# Patient Record
Sex: FEMALE | Race: WHITE | NOT HISPANIC OR LATINO | Employment: FULL TIME | URBAN - METROPOLITAN AREA
[De-identification: names, ages, dates, MRNs, and addresses within clinical notes are randomized per-mention and may not be internally consistent; named-entity substitution may affect disease eponyms.]

---

## 2024-09-09 ENCOUNTER — TELEPHONE (OUTPATIENT)
Dept: GYNECOLOGIC ONCOLOGY | Facility: CLINIC | Age: 39
End: 2024-09-09
Payer: COMMERCIAL

## 2024-09-09 NOTE — TELEPHONE ENCOUNTER
Caller: Chyna Driver    Relationship to patient: Self    Best call back number:   Telephone Information:   Mobile [phone]     Chief complaint: STARTING A NEW JOB    Type of visit: NEW PATIENT    Requested date: CALL TO R/S      If rescheduling, when is the original appointment: 9/11/2024

## 2024-10-04 ENCOUNTER — PREP FOR SURGERY (OUTPATIENT)
Dept: OTHER | Facility: HOSPITAL | Age: 39
End: 2024-10-04
Payer: COMMERCIAL

## 2024-10-04 ENCOUNTER — OFFICE VISIT (OUTPATIENT)
Dept: GYNECOLOGIC ONCOLOGY | Facility: CLINIC | Age: 39
End: 2024-10-04
Payer: COMMERCIAL

## 2024-10-04 ENCOUNTER — PATIENT ROUNDING (BHMG ONLY) (OUTPATIENT)
Dept: GYNECOLOGIC ONCOLOGY | Facility: CLINIC | Age: 39
End: 2024-10-04
Payer: COMMERCIAL

## 2024-10-04 VITALS
BODY MASS INDEX: 42.02 KG/M2 | WEIGHT: 246.1 LBS | TEMPERATURE: 97.1 F | SYSTOLIC BLOOD PRESSURE: 105 MMHG | DIASTOLIC BLOOD PRESSURE: 74 MMHG | RESPIRATION RATE: 17 BRPM | HEART RATE: 96 BPM | OXYGEN SATURATION: 99 % | HEIGHT: 64 IN

## 2024-10-04 DIAGNOSIS — D07.1 VIN III (VULVAR INTRAEPITHELIAL NEOPLASIA III): Primary | ICD-10-CM

## 2024-10-04 DIAGNOSIS — Z72.0 TOBACCO ABUSE: ICD-10-CM

## 2024-10-04 DIAGNOSIS — N87.1 CIN II (CERVICAL INTRAEPITHELIAL NEOPLASIA II): Primary | ICD-10-CM

## 2024-10-04 DIAGNOSIS — D07.1 VIN III (VULVAR INTRAEPITHELIAL NEOPLASIA III): ICD-10-CM

## 2024-10-04 DIAGNOSIS — N87.1 CIN II (CERVICAL INTRAEPITHELIAL NEOPLASIA II): ICD-10-CM

## 2024-10-04 PROCEDURE — 1160F RVW MEDS BY RX/DR IN RCRD: CPT | Performed by: OBSTETRICS & GYNECOLOGY

## 2024-10-04 PROCEDURE — 1159F MED LIST DOCD IN RCRD: CPT | Performed by: OBSTETRICS & GYNECOLOGY

## 2024-10-04 PROCEDURE — 99204 OFFICE O/P NEW MOD 45 MIN: CPT | Performed by: OBSTETRICS & GYNECOLOGY

## 2024-10-04 PROCEDURE — 88305 TISSUE EXAM BY PATHOLOGIST: CPT | Performed by: OBSTETRICS & GYNECOLOGY

## 2024-10-04 PROCEDURE — 1126F AMNT PAIN NOTED NONE PRSNT: CPT | Performed by: OBSTETRICS & GYNECOLOGY

## 2024-10-04 RX ORDER — LANOLIN ALCOHOL/MO/W.PET/CERES
CREAM (GRAM) TOPICAL
COMMUNITY
Start: 2024-09-23

## 2024-10-04 RX ORDER — FAMOTIDINE 20 MG/1
1 TABLET, FILM COATED ORAL EVERY 12 HOURS SCHEDULED
COMMUNITY
Start: 2024-09-06

## 2024-10-04 RX ORDER — CITALOPRAM HYDROBROMIDE 10 MG/1
1 TABLET ORAL DAILY
COMMUNITY
Start: 2024-09-13

## 2024-10-04 NOTE — PATIENT INSTRUCTIONS
Outpatient Patient Education  [facility]       Chyna Driver  [id number]  1985    SURGEON: Catalina Crum MD        Surgery Coordinator: Debbi DAVIS    Gynecological Oncology  [address]   [place], [address]  Phone: [phone]                   Fax: [phone]        Surgery Appointment      Your surgery at [facility] ([facility]) has been scheduled on 11/11/2024.  [facility] arrive at 12:15 am is located at [address], [address] [place], [address].    Nothing by mouth after midnight on 11/10/2024.    If you are feeling sick, have a fever or cough and have seen your PCP let our office know 48 hours prior to surgery. It may be subject to rescheduling.       The Day of Surgery:    Do not chew gum or tobacco, smoke, or eat mints or hard candy. Shower and wash your hair. You may brush your teeth but do not swallow water. Use any wipes that Pre-admission testing has given you.     Please arrive for surgery as instructed by the pre-op nurse, often one to two hours before your surgery.  Once you are called to go to your pre-op room, no one will be allowed in the pre op room.   Please note no one under age 12 is permitted to stay in the waiting area without supervision.  Remove all jewelry, including rings and piercings. Do not bring valuables to the hospital.  Wear loose-fitting clothing.  Avoid wearing eye makeup or contact lenses  We make every effort to begin surgery at your scheduled start time but delays do occur. We will keep you and your family updated about any delays  Please note: you MUST have a  over the age of 18 to drive you home from the hospital. You may not use Uber, Lyft or a taxi.    Please remember to bring:    Photo ID and current medical insurance card  Advanced directives, living will or power of  (if applicable)  Current list of all medications, including over-the- counter and herbal supplements  List of  allergies  CPAP device if you have sleep apnea  Any assistive devices or equipment needed after surgery    While You are In the Pre-Op Room:  The nurse will review your health history and will place an IV (into the vein) in your hand or arm for fluids and medicines.  An anesthesia provider will talk with you about anesthesia and pain control during and after surgery.  A member of the surgical team can answer your questions.    Directions to [facility]   [address] ? [place], [place] [address] ? [phone]      From [address] and [address] [facility]:  Take [address] to the [address] exit. Go right on [address] to [address]. Right on [address] to [address].   Left on [address] to [facility] which is on the left.    From [address] [facility]:  Get off [address] at the [address] exit. Go left on [address] to [address]. Right on [address] to [facility]. Left on   [address] to [facility] which is on the left.     From the South ([address]):  Follow [address] to approximately one mile inside [facility]. [facility] is on the right at [facility] and   [facility].     Parking:  Free  Parking - Take Entrance 2 off of [address] and go straight ahead to [address] [facility] [facility].  Self Parking - Take Entrance 1 off of [address], bear left and follow the road to [facility].    Directions to [facility]: Located in [facility]  ENTRANCE (1) SELF PARKING:Use Entrance 1 (closest to [address]) and keep left to park in the [facility].1st Floor Outpatient: After parking, enter the [address] and follow the signs to the [facility]  ENTRANCE (2) FREE  PARKING: Monday through Friday from 6a.m. to 6p.m.Use Entrance 2 and drive straight ahead toward the [address]. Free  Parking is available under the awning (keep  "right at the fork).1st Floor Outpatient: From the [address] Entrance, walk past Information Desk, turn left toward double doors (look for the Surgery Center sign).  Phone: [phone]             PREPARING FOR SURGERY  **Disability or Work Release Forms     Work: The amount of time you will be off work after surgery depends on both your surgery and your job. Discuss this with your doctor before surgery. If you have any questions about this, call your doctor.  You must provide all forms completed and signed to the GYN ONCOLOGY office.    FORM FOR AUHTHORIZATION FOR USE AND/OR DISCLOSURE OF PROCTED HEALTH INFORMATION CAN BE PROVIDED UPON REQUEST.    Preoperative Evaluation and Optimization  If your doctor tells you to get a preoperative evaluation from your primary care provider, cardiologist, or other specialist, it is your responsibility to make sure to complete these well before your surgery. We want you to get evaluated to make sure you are as healthy as possible when you have your surgery. If the evaluation, including all recommended testing, is not done in time, your surgery will be postponed.    If you take diabetic medications please consult with the prescriber.  Continue antidepressants, Beta Blockers \"olol\", anti-seizure medication, GERD medication (heartburn), Opioids and Parkinson's medication.  Let us know if you have a history of blood clots or are taking a blood thinner before your surgery, this will need to be held and you will need to discuss this with staff.   If you are taking any weight loss medications please let our staff now. Ideally they will need to be held 2 weeks prior to your procedure.  You are allowed 1 visitor that may remain in the waiting room at both locations.  Visitors cannot come back to pre-op or post-op areas.      Physical Fitness  Research shows that getting more physical activity before surgery can lower your risk for problems after surgery. Walking is a great way to " improve your fitness level before surgery. Even if you start walking just a few weeks before surgery, it can make a big difference.     Quit Smoking  If you smoke, your risk of having a lung problem is at least twice that of a non-smoker.    Surgical incisions will not heal as well and you have a higher risk of infection  The heart has to work harder.  It is best to quit smoking 6 to 8 weeks before surgery. This gives your lungs more time to recover.    Outpatient Surgery  You will need someone to bring you on the day of your surgery, stay in the waiting room during your procedure and take you home. You will also need someone to stay with you for the first 24 hours after your procedure.     If you live more than a 4-hour drive away from the hospital, or live in an area without easy access to an emergency department, we recommend you plan to spend another night or two close to the hospital before you go home. For assistance with hotel, prices and vouchers let our office know and we can let you talk with our Oncology Social worker, Nesha Leavitt.     Post-Operative Visit  You will be scheduled a post-operative appointment for 3 weeks after your surgical procedure. If you do not have an appointment please call the office and have that scheduled.     How to prevent nausea  The best way to prevent nausea is to eat frequent small meals. It is especially important to eat something before taking pain medication. Take your ondansetron if you are feeling nauseous, do not wait.    Pain Management after Surgery    If you have kidney disease or liver disease and are not to take ibuprofen or Tylenol please let your doctor or nurse know.     Driving: Do not drive while you are taking prescription pain medications.     It is normal to have some pain after surgery. The goal of managing your acute pain after surgery is to minimize your pain so you feel comfortable enough to get up, take deep breaths, wash, get dressed, and do simple  tasks in your home. Some discomfort is likely. We do not expect you to be completely free of pain.   Pain is usually worst the first 24-48 hours after surgery.    What can I do to relieve pain without medications?   Apply heat with a warm compress, hot water bottle, or heating pad. Do not put anything hot directly on your skin or lie on top of it.   Apply cooling with a cold gel pack, bag of peas, or crushed ice. Wrap in a soft cloth or towel.   Do not push or press on your incision. It is normal for your incision to be sore for up to 6 weeks if you push on it.   Unless your doctor gives you a different plan, ibuprofen and acetaminophen are the main medicines you will use to manage your pain.   You may also get a prescription for an opioid such as oxycodone or hydrocodone. Opioids should only be added as needed to reduce pain that is not adequately relieved by ibuprofen and acetaminophen.                                                                                         Typical Pain Medication Schedule  6 am Ibuprofen 600 mg   9 am acetaminophen 650 mg   12:00 pm Noon Ibuprofen 600 mg   3:00 pm Acetaminophen 650 mg   6:00 pm Ibuprofen 600 mg   9:00 pm Acetaminophen 650 mg   12:00 am Midnight  Ibuprofen 600 mg.      What if this schedule does not control my pain?   Please call the office and let us know at [phone]  Reduce the number and frequency of opioids as soon as you can. Do not take more opioid medication than your doctor has prescribed.   Common side effects and risks of opioids include drowsiness, mental confusion, dizziness, nausea, constipation, itching, dry mouth, and slowed breathing.   Never mix opioids with alcohol, sleep aids or anti-anxiety medications. These are dangerous combinations that increase the harmful effects of opioid pain medication. Many overdose deaths from opioids also involve at least one other drug or alcohol.   It is illegal to sell or share an opioid without a prescription  properly issued by a licensed health care prescriber.    What is the best way to stop taking pain medications?  1. Stop opioid use.  2. Stop acetaminophen.  3. Gradually decrease how often you take ibuprofen. It is a good idea to take a 600 mg pill before you start a more tiring activity such as going shopping or for a long walk.  Once you get more active, you may have a day when your pain gets a little worse. If this happens, take ibuprofen. If ibuprofen does not relieve the pain, add acetaminophen.    What do I need to know about bowel movements?   Starting as soon as you get home, take 17 grams of Miralax (one capful) twice a day to keep your stool soft and prevent constipation. It is important to prevent constipation because straining can damage your stitches. Your stool should be as soft as toothpaste. If your stool gets too loose, cut back to using Miralax only once a day.   If you used a bowel prep before surgery, it is common not to have a bowel movement on the first and second day after surgery.   If you have not had a bowel movement by 7 p.m. on the third day after surgery, do one of the following at bedtime:  Drink 1 ounce (2 tablespoons) of Milk of Magnesia (MOM). If you have used MOM before and know you need to take 2 ounces for it to work for you, it is OK to do this, or Take 2 Senekot tablets.   Go for short walks. Walking and being active will help you have a bowel movement.   If you have not had a bowel movement by noon on the fourth day after surgery, call the clinic where you were seen and ask to speak with a nurse.    What kind of vaginal bleeding is normal?  Spotting of pink or red blood from the vagina is normal. Brown-colored discharge that gradually changes to a light yellow or cream color is also normal and can last up to 6 weeks. The brownish discharge is old blood and often has a strong odor, this is okay. Call us if it becomes heavier or foul smelling or you are saturating a maxi pad  within an hour.     At Home after Surgery: If you experience a medical emergency call 911 or have someone drive you to your nearest emergency department.     When should I call my doctor?  Call your doctor right away, any time of the day or night, including on weekends and holidays, if you have any of the following signs or symptoms:   A temperature over 100.4°F (38°C) If you don't have one, please buy a thermometer before your surgery.   Heavy bleeding (soaking a regular pad in an hour or less)   Severe pain in your abdomen or pelvis that the pain medication is not helping   Chest pain or difficulty breathing   Swelling, redness, or pain in your legs   An incision that opens   An incision that is red or hot   Fluid or blood leaking from an incision   New bruising after leaving the hospital that is large or spreading. A little bit of bruising around an incision is normal.   Nausea and vomiting    Skin rash   Unable to urinate at all   Pain or stinging when you pass urine   Blood or cloudiness in your urine   Non-stop urge to pass urine, but only dribbling when you try to go   A sense that something is wrong.    Caring for post-surgical incisions     Do not have vaginal intercourse until your doctor evaluates you at a postop visit and tells you OK.     Showers: You may shower starting 24 hours after your surgery.    NO BATHS: do not take a tub bath up to 6 weeks after surgery.   Do not put any lotion, oil, gel, or powder on or near your incisions.     For incisions inside your vagina: Incisions inside the vagina are closed with dissolvable stitches. When they dissolve you may see little bits of suture material that look like thin pieces of string on your underwear or on toilet tissue after wiping. This is normal. Do not put anything inside the vagina until your doctor evaluates you at a postop visit and tells you when it will be OK.      For incisions on your skin: If there is a dressing over the incision, remove it  before your first shower. Leave the slim adhesive strips that are under the dressing in place. During the week after surgery, they will usually curl up at the edges and then come off on their own. If they are still there a week after surgery, gently remove them.  To clean the incisions, first wash your hands, and then get your hands sudsy with soap and gently wash or let the sudsy water run down over the incisions. Dry the incisions well after washing by gently patting with a towel. You may use a blow dryer, but it must be on a low-heat setting.    When will my bladder function get back to normal?   You received extra fluid through your I.V. while you were in the hospital, so it is normal to urinate (pee) more than usual when you first get home.   It is normal for your bladder function to be different after surgery. You may notice a pause before your urine stream starts or that your urine stream is slower. This will gradually get better, but it may take up to 6 months before you are back to normal. Be patient, relax, and sit on the toilet a little longer.   Drinking more water than usual will not help the bladder recover faster.    What is a normal energy level?  It is normal to have a decreased energy level after surgery. Listen to your body. If you need to rest, do it. Give yourself permission to take it easy. Once you settle into a normal routine at home, you will find that you slowly begin to feel better. Walking around the house and taking short walks outside will help you get back to normal.    What kind of exercise/activities can I do?     Exercise is important for a healthy recovery. We encourage you to begin normal physical activity, like walking, within hours of surgery. Start with short walks and gradually increase the distance and length of time that you walk.   Allow your body time to heal. Do not restart a difficult exercise routine until you have had your post-op exam and your doctor says it is OK.    Lifting: Unless you are given other instructions, for 6 weeks after your surgery do not lift anything over 15 pounds.   Travel: It is best if you do not go far away from home before your postop visit with your doctor. If you have travel plans, talk to your doctor about this before your surgery.  Listen to your body and gradually increase what you do. If you start to feel tired, sore, or in pain, lie down to rest.      Financial Assistance:    If you have any questions or need assistance, contact your [facility] financial counseling office from 8:30 a.m.-4:30  p.m. Monday through Friday. Closed weekends.   [address]: [phone] or, or visit at [address], near the entrance.  Financial Assistance Application available upon request      Patient Payments and Correspondence  Customer service representatives are available to assist you from 8:00 a.m. to 6:00 p.m. Eastern Standard Time by calling [phone] Monday through Friday. You can also contact us through [employer].    [facility]  [id number]  [place] [address] [phone]

## 2024-10-04 NOTE — PROGRESS NOTES
Chyna Driver  [id number]  1985      Reason for visit: Vulvar and cervical dysplasia    Consultation:  Patient is being seen at the request of Dr. Adriana Angeles    History of present illness:  The patient is a 38 y.o. year old female who presents today for treatment and evaluation of the above issues.    Patient had Pap smear that showed ASCUS, high risk HPV 16 positive and underwent subsequent colposcopy with biopsy.  Endocervical curettings showed at least LISSET-2.  In addition patient underwent biopsy of perineal lesion and this showed high-grade squamous intraepithelial lesion.  Biopsies performed 2024.  Patient was referred for further management.  Today, she notes some cramping with cycles and intermittently at other times.    + Tobacco, 1 to <1 PPD.    For new patients, [facility] intake form from today was reviewed and confirmed.    OBGYN History:  She is a .  She is s/p tubal ligation and is not sexually active right now.  She does have a history of abnormal pap smears, maybe x 5 years, but has never had a conization.    Oncologic History:  Oncology/Hematology History    No history exists.         Past Medical History:   Diagnosis Date    Asthma     Back pain     Depression     Fatigue     Heartburn     Subjective change in urination     Weight gain        Past Surgical History:   Procedure Laterality Date     SECTION  2019     SECTION  2011     SECTION         MEDICATIONS:    Current Outpatient Medications:     citalopram (CeleXA) 10 MG tablet, Take 1 tablet by mouth Daily., Disp: , Rfl:     famotidine (PEPCID) 20 MG tablet, Take 1 tablet by mouth Every 12 (Twelve) Hours., Disp: , Rfl:     melatonin 3 MG tablet, TAKE TWO (2) TABLET BY MOUTH EVERY NIGHT AT BEDTIME FOR SLEEP, Disp: , Rfl:      Allergies:  is allergic to omnicef [cefdinir].    Social History:   Social History     Socioeconomic History    Marital status: Single   Tobacco Use    Smoking status: Every  "Day     Current packs/day: 1.00     Average packs/day: 1 pack/day for 21.8 years (21.8 ttl pk-yrs)     Types: Cigarettes     Start date: 2003     Passive exposure: Current    Smokeless tobacco: Never   Vaping Use    Vaping status: Some Days    Substances: Nicotine    Devices: Disposable    Passive vaping exposure: Yes   Substance and Sexual Activity    Alcohol use: Not Currently    Drug use: Not Currently     Types: Methamphetamines    Sexual activity: Not Currently     Partners: Male     Birth control/protection: None, Tubal ligation       Family History:    Family History   Problem Relation Age of Onset    Hypertension Father     Diabetes Mother     Clotting disorder Mother     Asthma Brother     No Known Problems Daughter        Health Maintenance:    Health Maintenance   Topic Date Due    BMI FOLLOWUP  Never done    INFLUENZA VACCINE  08/01/2024    COVID-19 Vaccine (1 - 2023-24 season) Never done    ANNUAL PHYSICAL  Never done    PAP SMEAR  Never done    TDAP/TD VACCINES (2 - Td or Tdap) 09/24/2027    HEPATITIS C SCREENING  Completed    Pneumococcal Vaccine 0-64  Aged Out       Review of Systems:  Please refer to history of present illness.  Review of systems otherwise negative.  Physical Exam:  Vitals:    10/04/24 1050   BP: 105/74   Pulse: 96   Resp: 17   Temp: 97.1 °F (36.2 °C)   TempSrc: Temporal   SpO2: 99%   Weight: 112 kg (246 lb 1.6 oz)   Height: 162.6 cm (64.02\")   PainSc: 0-No pain     Body mass index is 42.22 kg/m².  Wt Readings from Last 3 Encounters:   10/04/24 112 kg (246 lb 1.6 oz)     PHQ-9 Depression Screening  Little interest or pleasure in doing things? 0-->not at all   Feeling down, depressed, or hopeless? 0-->not at all   Trouble falling or staying asleep, or sleeping too much?     Feeling tired or having little energy?     Poor appetite or overeating?     Feeling bad about yourself - or that you are a failure or have let yourself or your family down?     Trouble concentrating on things, " such as reading the newspaper or watching television?     Moving or speaking so slowly that other people could have noticed? Or the opposite - being so fidgety or restless that you have been moving around a lot more than usual?     Thoughts that you would be better off dead, or of hurting yourself in some way?     PHQ-9 Total Score 0   If you checked off any problems, how difficult have these problems made it for you to do your work, take care of things at home, or get along with other people?         GENERAL: Alert, well-appearing female appearing her stated age who is in no apparent distress.   HEENT: Sclera anicteric. Head normocephalic, atraumatic. Mucus membranes moist.   NECK: Trachea midline, supple, without masses.  No thyromegaly.   BREASTS: Deferred  CARDIOVASCULAR: Normal rate, regular rhythm, no murmurs, rubs, or gallops.  No peripheral edema.  RESPIRATORY: Clear to auscultation bilaterally, normal respiratory effort  BACK:  No CVA tenderness, no vertebral tenderness on palpation  GASTROINTESTINAL:  Abdomen is soft, non-tender, non-distended, no rebound or guarding, no masses, or hernias. No HSM.   SKIN:  Warm, dry, well-perfused.  All visible areas intact.  No rashes, lesions, ulcers.  PSYCHIATRIC: AO x3, with appropriate affect, normal thought processes.  NEUROLOGIC: No focal deficits.  Moves extremities well.  MUSCULOSKELETAL: Normal gait and station.   EXTREMITIES:   No cyanosis, clubbing, symmetric.  LYMPHATICS:  No cervical or inguinal adenopathy noted.     PELVIC exam:    Physical Exam  Genitourinary:      Genitourinary Comments: White epithelium C/W RAHUL III                 ECOG PS 0    PROCEDURES:  After obtaining informed consent, colposcopy was performed of cervix  3% acetic acid was applied.  Colposcopy was adequate.  Findings were remarkable for AWE as above.  Biopsy was performed (3 o'clock).  Adequate hemostasis was noted at the end of the procedure.  Procedure was  "well-tolerated.      Diagnostic Data:    No Images in the past 120 days found..    Lab Results   Component Value Date    WBC 8.9 06/25/2019    HGB 11.0 (L) 06/25/2019    HCT 33.1 06/25/2019    MCV 82.5 06/25/2019     06/25/2019    NEUTROABS 5.4 06/25/2019    BUN 5 (L) 06/05/2019    CREATININE 0.4 06/05/2019     (L) 06/05/2019    K 3.3 (L) 06/05/2019     06/05/2019    CO2 23 06/05/2019    CALCIUM 8.6 06/05/2019    ALBUMIN 2.9 (L) 06/05/2019    AST 70 (H) 06/05/2019     (H) 06/05/2019    BILITOT 0.3 06/05/2019     No results found for: \"TSH\"  No results found for: \"FT4\"  No results found for: \"\"    Assessment & Plan   This is a 38 y.o. woman with newly diagnosed LISSET 2, RAHUL 3  Encounter Diagnoses   Name Primary?    RAHUL III (vulvar intraepithelial neoplasia III) Yes    LISSET II (cervical intraepithelial neoplasia II)     Tobacco abuse      Patient was consented for cold knife conization, simple partial vulvectomy, possible biopsies    Risks and benefits of surgery were discussed.  This included, but was not limited to, infection and bleeding like when the skin is cut; damage to surrounding structures; and incisional complications.  Risk of DVT was addressed for major surgeries.  Standard of care efforts to minimize these risks were reviewed.  Typical hospital stay and recovery were discussed as well as post-procedure precautions.  Surgical implications of chronic illnesses on recovery and surgical outcome were reviewed.   Pain medication regimen for postoperative care was discussed.  Typical regimen and avoidance of narcotics was discussed.  Patient was educated that other factors, such as existing narcotic use, can impact postoperative pain management.    Patient verbalized understanding of the plan including the risks and benefits.  Appropriate perioperative testing including laboratory evaluation, EKG as clinically indicated, chest x-ray as clinically indicated, and preadmission " evaluation were all ordered as a part of this patient's care.  She understands that she is at increased risk for adverse outcomes related to her tobacco abuse.  This includes incisional complications such as separation.  In addition, we discussed the relationship of tobacco abuse and cervical/vulvar dysplasia.  She was made aware that it increases her risk of recurrence and increases the likelihood of dysplasia.  She was encouraged to cut down or quit smoking.    Pain assessment was performed today as a part of patient’s care.  For patients with pain related to surgery, gynecologic malignancy or cancer treatment, the plan is as noted in the assessment/plan.  For patients with pain not related to these issues, they are to seek any further needed care from a more appropriate provider, such as PCP.      No orders of the defined types were placed in this encounter.      FOLLOW UP: surgery    I spent 45 minutes caring for Chyna on this date of service. This time includes time spent by me in the following activities: preparing for the visit, reviewing tests, performing a medically appropriate examination and/or evaluation, counseling and educating the patient/family/caregiver, referring and communicating with other health care professionals, documenting information in the medical record, care coordination, ordering medications, ordering test(s), and ordering procedure(s)  I spent 7 minutes on the separately reported service of colposcopy wit biopsy. This time is not included in the time used to support the E/M service also reported today.    Electronically Signed by: Catalina Crum MD  Date: 10/4/2024

## 2024-10-08 LAB
CYTO UR: NORMAL
LAB AP CASE REPORT: NORMAL
LAB AP CLINICAL INFORMATION: NORMAL
PATH REPORT.FINAL DX SPEC: NORMAL
PATH REPORT.GROSS SPEC: NORMAL

## 2024-10-09 ENCOUNTER — TELEPHONE (OUTPATIENT)
Dept: GYNECOLOGIC ONCOLOGY | Facility: CLINIC | Age: 39
End: 2024-10-09
Payer: COMMERCIAL

## 2024-10-09 NOTE — TELEPHONE ENCOUNTER
RN called patient and reported path results. Patient was made aware that the MD wants to move forward with the cervical conization due to path discrepancy.   Patient verbalized understanding.   ----- Message from Catalina Crum sent at 10/8/2024  4:20 PM EDT -----  Please notify patient of result.  Still need to proceed with cervical conization due to the discrepancy in pathology  Thanks  ----- Message -----  From: Lab, Background User  Sent: 10/8/2024  10:41 AM EDT  To: Catalina Crum MD

## 2024-10-25 ENCOUNTER — TELEPHONE (OUTPATIENT)
Dept: GYNECOLOGIC ONCOLOGY | Facility: CLINIC | Age: 39
End: 2024-10-25
Payer: COMMERCIAL

## 2024-10-25 NOTE — TELEPHONE ENCOUNTER
Caller: Chyna Driver    Relationship: Self    Best call back number: [phone] (PT'S MOTHER)    What is the best time to reach you: ANYTIME    Who are you requesting to speak with (clinical staff, provider,  specific staff member): CLINICAL    What was the call regarding: PT REQUESTING CALLBACK RE LAB RESULTS

## 2024-10-25 NOTE — TELEPHONE ENCOUNTER
RN returned patient call.  She is inquiring about lab work specifically her A1C.  A1C and glucose reported to patient.  Advised patient to follow-up with her PCP regarding elevated glucose to discuss monitoring and previous results.  Patient verbalized understanding.

## 2024-11-08 ENCOUNTER — TELEPHONE (OUTPATIENT)
Dept: GYNECOLOGIC ONCOLOGY | Facility: CLINIC | Age: 39
End: 2024-11-08
Payer: COMMERCIAL

## 2024-11-08 NOTE — TELEPHONE ENCOUNTER
CONFIRM SURGERY AT [facility] ON 11/11/2024. PLEASE ARRIVE AT 12:15.     NPO AFTER MIDNIGHTON 11/10

## 2024-11-11 ENCOUNTER — OUTSIDE FACILITY SERVICE (OUTPATIENT)
Dept: GYNECOLOGIC ONCOLOGY | Facility: CLINIC | Age: 39
End: 2024-11-11
Payer: COMMERCIAL

## 2024-11-11 PROCEDURE — 88307 TISSUE EXAM BY PATHOLOGIST: CPT | Performed by: OBSTETRICS & GYNECOLOGY

## 2024-11-11 PROCEDURE — 88305 TISSUE EXAM BY PATHOLOGIST: CPT | Performed by: OBSTETRICS & GYNECOLOGY

## 2024-11-11 RX ORDER — OXYCODONE HYDROCHLORIDE 5 MG/1
5 TABLET ORAL EVERY 6 HOURS PRN
Qty: 5 TABLET | Refills: 0 | Status: SHIPPED | OUTPATIENT
Start: 2024-11-11 | End: 2024-11-14 | Stop reason: SDUPTHER

## 2024-11-11 RX ORDER — IBUPROFEN 600 MG/1
600 TABLET, FILM COATED ORAL EVERY 6 HOURS PRN
Qty: 30 TABLET | Refills: 0 | Status: SHIPPED | OUTPATIENT
Start: 2024-11-11

## 2024-11-11 RX ORDER — ACETAMINOPHEN 325 MG/1
650 TABLET ORAL EVERY 6 HOURS PRN
Qty: 60 TABLET | Refills: 0 | Status: SHIPPED | OUTPATIENT
Start: 2024-11-11

## 2024-11-11 RX ORDER — SILVER SULFADIAZINE 10 MG/G
1 CREAM TOPICAL 2 TIMES DAILY
Qty: 400 G | Refills: 1 | Status: SHIPPED | OUTPATIENT
Start: 2024-11-11

## 2024-11-12 ENCOUNTER — LAB REQUISITION (OUTPATIENT)
Dept: LAB | Facility: HOSPITAL | Age: 39
End: 2024-11-12
Payer: COMMERCIAL

## 2024-11-12 DIAGNOSIS — N87.1 MODERATE CERVICAL DYSPLASIA: ICD-10-CM

## 2024-11-14 ENCOUNTER — TELEPHONE (OUTPATIENT)
Dept: GYNECOLOGIC ONCOLOGY | Facility: CLINIC | Age: 39
End: 2024-11-14
Payer: COMMERCIAL

## 2024-11-14 NOTE — TELEPHONE ENCOUNTER
"RN received call from patient stating that she is really struggling with pain management after her surgery on Monday. Patient states that her bottom \"feels raw.\" Patient is out of her Roxicodone and is taking ibuprofen and tylenol as needed. RN encouraged patient to keep the wound open to air. Use squirt bottle for cleaning and pat dry.  Patient is using the silvadene cream prescribes to her. RN also encouraged patient to take her medication around the clock, every 6 hours for the next few days to get on top of her pain.    RN educated the patient on signs and symptoms of infection such as fever, chills, foul smelling drainage etc. Patient was told that if those occur she should seek emergency medical attention and go to the ER.  RN will request more pain medication from Dr. Crum for patient.  "

## 2024-11-14 NOTE — TELEPHONE ENCOUNTER
RN called patient and reported negative margins of vulva and cervix per MD. No invasive cancer. Follow up as scheduled. Patient verbalized understanding.     ----- Message from Catalina Crum sent at 11/14/2024  9:20 AM EST -----  Please notify patient that margins on the vulva were negative and there was a precancer that was high-grade.  There is no invasive cancer.  Also, there is a high-grade precancer of the cervix, no invasive cancer, negative margins.  Follow-up as scheduled.  Thank you  ----- Message -----  From: Lab, Background User  Sent: 11/13/2024   2:01 PM EST  To: Catalina Crum MD

## 2024-11-15 ENCOUNTER — TELEPHONE (OUTPATIENT)
Dept: GYNECOLOGIC ONCOLOGY | Facility: CLINIC | Age: 39
End: 2024-11-15
Payer: COMMERCIAL

## 2024-11-15 RX ORDER — OXYCODONE HYDROCHLORIDE 5 MG/1
5 TABLET ORAL EVERY 6 HOURS PRN
Qty: 10 TABLET | Refills: 0 | Status: SHIPPED | OUTPATIENT
Start: 2024-11-15

## 2024-11-15 NOTE — TELEPHONE ENCOUNTER
Patient's mother called stating that the patient is in a lot of pain. RN had talked to patient yesterday and told patient to take her OTC pain relievers around the clock. Patient did that but is still in a lot of pain MD sent a refill of oxycodone to the patient's pharmacy today. RN told the mother of the patient that the medication was at the pharmacy.   RN also encouraged a sitz bath with epsom salts for comfort. Mother verbalized understanding and was planning to get supplies for the patient.

## 2024-11-19 ENCOUNTER — OFFICE VISIT (OUTPATIENT)
Dept: GYNECOLOGIC ONCOLOGY | Facility: CLINIC | Age: 39
End: 2024-11-19
Payer: COMMERCIAL

## 2024-11-19 VITALS
SYSTOLIC BLOOD PRESSURE: 119 MMHG | OXYGEN SATURATION: 98 % | BODY MASS INDEX: 43.16 KG/M2 | HEIGHT: 64 IN | RESPIRATION RATE: 17 BRPM | TEMPERATURE: 98.2 F | DIASTOLIC BLOOD PRESSURE: 80 MMHG | WEIGHT: 252.8 LBS | HEART RATE: 94 BPM

## 2024-11-19 DIAGNOSIS — D07.1 VIN III (VULVAR INTRAEPITHELIAL NEOPLASIA III): Primary | ICD-10-CM

## 2024-11-19 DIAGNOSIS — N87.1 CIN II (CERVICAL INTRAEPITHELIAL NEOPLASIA II): ICD-10-CM

## 2024-11-19 DIAGNOSIS — Z87.891 HISTORY OF TOBACCO ABUSE: ICD-10-CM

## 2024-11-19 PROCEDURE — 1125F AMNT PAIN NOTED PAIN PRSNT: CPT | Performed by: OBSTETRICS & GYNECOLOGY

## 2024-11-19 PROCEDURE — 1160F RVW MEDS BY RX/DR IN RCRD: CPT | Performed by: OBSTETRICS & GYNECOLOGY

## 2024-11-19 PROCEDURE — 1159F MED LIST DOCD IN RCRD: CPT | Performed by: OBSTETRICS & GYNECOLOGY

## 2024-11-19 PROCEDURE — 99024 POSTOP FOLLOW-UP VISIT: CPT | Performed by: OBSTETRICS & GYNECOLOGY

## 2024-11-19 RX ORDER — ONDANSETRON 4 MG/1
4 TABLET, FILM COATED ORAL EVERY 6 HOURS PRN
Qty: 20 TABLET | Refills: 0 | Status: SHIPPED | OUTPATIENT
Start: 2024-11-19

## 2024-11-19 NOTE — PROGRESS NOTES
"Chyna Driver  [id number]  1985      Reason for Visit:  Postoperative evaluation    History of Present Illness:  Patient is a very pleasant 39 y.o. woman who presents for a post operative evaluation status post simple partial vulvectomy and cold knife conization of cervix performed on 11/11/2024.      Surgery and hospital course were uncomplicated.  Today, patient notes severe postoperative constipation.  She had no bowel movement from surgery till 3 days ago.  She had a large bowel movement last night and feels much better overall.  She notes incisional pulling with straining due to constipation and thinks she might of ripped her stitches.  She has stopped smoking and was congratulated on this.  She notes foul-smelling vaginal discharge, black vaginal discharge.  She notes concerned about pain at vulvar incision.    Past Medical History, Past Surgical History, Social History, Family History have been reviewed and are without significant changes except as mentioned.    Review of Systems   All other systems were reviewed and are negative except as mentioned above.    Medications:  The current medication list was reviewed in the EMR    ALLERGIES:    Allergies   Allergen Reactions    Omnicef [Cefdinir] Hives         /80   Pulse 94   Temp 98.2 °F (36.8 °C) (Temporal)   Resp 17   Ht 162.6 cm (64.02\")   Wt 115 kg (252 lb 12.8 oz)   SpO2 98%   BMI 43.37 kg/m²   ECOG score: 1       Physical Exam  Constitutional:  Patient is a pleasant woman in no acute distress.  Extremities:  Bilateral lower extremities are non-tender.  Gynecologic: External genitalia remarkable for superficial separation of perineal incision.  Exudate noted.  No erythema or induration suggestive of infection.  Discharge from the Monsel's throughout the vulvovaginal area.  Speculum exam deferred due to ongoing healing.    PATHOLOGY:  Final Diagnosis   1.  CERVIX, STITCH AT 12:00, CONE BIOPSY:  Cervix at transition zone with " high-grade intraepithelial neoplasia (LISSET 2-3)  No evidence of invasive carcinoma  The surgical margins are free of dysplasia     2.  ENDOCERVIX, CURETTAGE:  Strips of benign endocervical mucosa  Negative for dysplasia or carcinoma     3.  PERINEUM, STITCH AT RIGHT APEX, EXCISION:  Squamous mucosa with high-grade intraepithelial neoplasia (RAHUL 3)  No evidence of invasive carcinoma  The surgical margins are free of carcinoma       ASSESSMENT/PLAN:  Chyna Driver returns for a post-operative evaluation today.  All pathology reports were discussed with the patient.  Encounter Diagnoses   Name Primary?    RAHUL III (vulvar intraepithelial neoplasia III) Yes    LISSET II (cervical intraepithelial neoplasia II)     History of tobacco abuse          Overall, the patient is very pleased with her care.  I recommended continuation of post operative precautions as discussed.   Wound care was discussed.  Interdry was provided.  Patient educated that she would have to undergo wound care to ensure healing.  She would like to return to work and I think this is reasonable.      She is to follow-up 2 weeks    Catalina Crum MD  11/19/24  13:41 EST

## 2024-11-26 ENCOUNTER — TELEPHONE (OUTPATIENT)
Dept: GYNECOLOGIC ONCOLOGY | Facility: CLINIC | Age: 39
End: 2024-11-26
Payer: COMMERCIAL

## 2024-11-26 NOTE — TELEPHONE ENCOUNTER
RN returned pt call.  Patient states she woke up this morning and was bleeding. Not sure if she started her period or if it is from her surgery.  Pt states he Mom has not had the chance to look at surgical wound.  RN inquired if bleeding was comparable to her normal cycle and patient states it is.  RN advised pt to observe her flow, have her Mom look at wound ASAP, to call if flow increases, or to go to closest ER if the bleeding becomes more than a normal cycle.  Patient verbalized understanding.

## 2024-12-10 ENCOUNTER — TELEPHONE (OUTPATIENT)
Dept: GYNECOLOGIC ONCOLOGY | Facility: CLINIC | Age: 39
End: 2024-12-10
Payer: COMMERCIAL

## 2024-12-10 NOTE — TELEPHONE ENCOUNTER
Caller: Chyna Driver    Relationship: Self    Best call back number:     [phone]     What is the best time to reach you: ANYTIME     Who are you requesting to speak with (clinical staff, provider,  specific staff member): CLINICAL    What was the call regarding: PT IS STILL HAVING ISSUES HAVING A BOWEL MOVEMENT. PT DID STOP TAKING THE PAIN MEDICATION BEFORE THE LAST TIME SHE CAME IN.     PT HAS TRIED TAKING MILK OF MAGNESIA AND OTHER OTC MEDICATIONS AND THEY HAVE NOT REALLY HELPED. IS THERE SOMETHING THAT CAN BE CALLED IN FOR THE PT.     [facility] - [place], [place] - [address] [place] [address] [phone] [facility] [phone]     Is it okay if the provider responds through MyChart: N/A

## 2024-12-11 NOTE — TELEPHONE ENCOUNTER
RN returned pt call.  Patient states she is still having trouble with her bowel movements being routine.  RN inquired about what she is using to assist with Bms.  States she is taking fiber gummies and Milk of Mag.  RN advised pt to use Miralax (or generic equivalent) each morning and Colace (or generic equivalent) 3 at bedtime and to increase water intake.  RN advised this must be consistent usage and to call if she has continued issues.  Patient verbalized understanding and appreciative of call.

## 2025-02-26 ENCOUNTER — OFFICE VISIT (OUTPATIENT)
Dept: GYNECOLOGIC ONCOLOGY | Facility: CLINIC | Age: 40
End: 2025-02-26
Payer: COMMERCIAL

## 2025-02-26 VITALS
HEIGHT: 64 IN | WEIGHT: 258 LBS | HEART RATE: 102 BPM | RESPIRATION RATE: 16 BRPM | BODY MASS INDEX: 44.05 KG/M2 | OXYGEN SATURATION: 98 % | TEMPERATURE: 98.7 F | DIASTOLIC BLOOD PRESSURE: 89 MMHG | SYSTOLIC BLOOD PRESSURE: 138 MMHG

## 2025-02-26 DIAGNOSIS — N87.1 CIN II (CERVICAL INTRAEPITHELIAL NEOPLASIA II): Primary | ICD-10-CM

## 2025-02-26 DIAGNOSIS — D07.1 VIN III (VULVAR INTRAEPITHELIAL NEOPLASIA III): ICD-10-CM

## 2025-02-26 PROCEDURE — 1126F AMNT PAIN NOTED NONE PRSNT: CPT | Performed by: OBSTETRICS & GYNECOLOGY

## 2025-02-26 PROCEDURE — 99212 OFFICE O/P EST SF 10 MIN: CPT | Performed by: OBSTETRICS & GYNECOLOGY

## 2025-02-26 PROCEDURE — 1159F MED LIST DOCD IN RCRD: CPT | Performed by: OBSTETRICS & GYNECOLOGY

## 2025-02-26 PROCEDURE — 1160F RVW MEDS BY RX/DR IN RCRD: CPT | Performed by: OBSTETRICS & GYNECOLOGY

## 2025-02-26 NOTE — PROGRESS NOTES
"Chyna Driver  [id number]  1985      Reason for Visit:  Postoperative evaluation    History of Present Illness:  Patient is a very pleasant 39 y.o. woman who presents for a post operative evaluation status post simple partial vulvectomy and cold knife conization of cervix performed on 11/11/2024.      Surgery was uncomplicated.  Today, patient notes she has had complete healing.  She had some delays in follow-up due to weather.  She notes some regular menses with 2 cycles in January and dysmenorrhea which is new.  We discussed the possibility of scarring related to cervical conization.    Past Medical History, Past Surgical History, Social History, Family History have been reviewed and are without significant changes except as mentioned.    Review of Systems   All other systems were reviewed and are negative except as mentioned above.    Medications:  The current medication list was reviewed in the EMR    ALLERGIES:    Allergies   Allergen Reactions    Omnicef [Cefdinir] Hives         /89   Pulse 102   Temp 98.7 °F (37.1 °C) (Temporal)   Resp 16   Ht 162.6 cm (64\")   Wt 117 kg (258 lb)   SpO2 98%   BMI 44.29 kg/m²   ECOG score: 1       Physical Exam  Constitutional:  Patient is a pleasant woman in no acute distress.  Extremities:  Bilateral lower extremities are non-tender.  Gynecologic: External genitalia remarkable for superficial separation of perineal incision.  Exudate noted.  No erythema or induration suggestive of infection.  Discharge from the Monsel's throughout the vulvovaginal area.  Speculum exam deferred due to ongoing healing.    PATHOLOGY:  Final Diagnosis   1.  CERVIX, STITCH AT 12:00, CONE BIOPSY:  Cervix at transition zone with high-grade intraepithelial neoplasia (LISSET 2-3)  No evidence of invasive carcinoma  The surgical margins are free of dysplasia     2.  ENDOCERVIX, CURETTAGE:  Strips of benign endocervical mucosa  Negative for dysplasia or carcinoma     3.  PERINEUM, " STITCH AT RIGHT APEX, EXCISION:  Squamous mucosa with high-grade intraepithelial neoplasia (RAHUL 3)  No evidence of invasive carcinoma  The surgical margins are free of carcinoma       ASSESSMENT/PLAN:  Chyna Driver returns for a post-operative evaluation today.  All pathology reports were discussed with the patient.  Encounter Diagnoses   Name Primary?    LISSET II (cervical intraepithelial neoplasia II) Yes    RAHUL III (vulvar intraepithelial neoplasia III)          Overall, the patient is very pleased with her care.  I recommended that she keeps having Pap smears and regular gynecologic care    She is to follow-up on as-needed basis    I spent 15 minutes caring for Chyna on this date of service. This time includes time spent by me in the following activities: preparing for the visit, reviewing tests, performing a medically appropriate examination and/or evaluation, counseling and educating the patient/family/caregiver, referring and communicating with other health care professionals, and documenting information in the medical record    Catalina Crum MD  02/26/25  13:41 EST